# Patient Record
Sex: MALE | Race: WHITE | Employment: UNEMPLOYED | URBAN - METROPOLITAN AREA
[De-identification: names, ages, dates, MRNs, and addresses within clinical notes are randomized per-mention and may not be internally consistent; named-entity substitution may affect disease eponyms.]

---
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
## 2025-02-17 NOTE — ED TRIAGE NOTES
Pt reports blood in his urine since Monday and was seen at [facility] and started on doxycycline. Pt reports this am he woke up with pain in hs L testicle. Pt reports pain is 8/10 at this time.  Pt reports vomiting 4-5 times this am. English